# Patient Record
Sex: FEMALE | URBAN - METROPOLITAN AREA
[De-identification: names, ages, dates, MRNs, and addresses within clinical notes are randomized per-mention and may not be internally consistent; named-entity substitution may affect disease eponyms.]

---

## 2018-10-04 ENCOUNTER — EMERGENCY (EMERGENCY)
Facility: HOSPITAL | Age: 48
LOS: 1 days | Discharge: HOME | End: 2018-10-04
Attending: STUDENT IN AN ORGANIZED HEALTH CARE EDUCATION/TRAINING PROGRAM
Payer: COMMERCIAL

## 2018-10-04 VITALS
DIASTOLIC BLOOD PRESSURE: 89 MMHG | TEMPERATURE: 98 F | OXYGEN SATURATION: 99 % | HEART RATE: 83 BPM | RESPIRATION RATE: 18 BRPM | SYSTOLIC BLOOD PRESSURE: 158 MMHG

## 2018-10-04 VITALS
OXYGEN SATURATION: 96 % | HEART RATE: 82 BPM | RESPIRATION RATE: 16 BRPM | TEMPERATURE: 98 F | DIASTOLIC BLOOD PRESSURE: 85 MMHG | SYSTOLIC BLOOD PRESSURE: 149 MMHG

## 2018-10-04 PROCEDURE — 99283 EMERGENCY DEPT VISIT LOW MDM: CPT | Mod: 25

## 2018-10-04 PROCEDURE — 99283 EMERGENCY DEPT VISIT LOW MDM: CPT

## 2018-10-04 RX ORDER — OFLOXACIN OTIC SOLUTION 3 MG/ML
5 SOLUTION/ DROPS AURICULAR (OTIC)
Qty: 1 | Refills: 0 | OUTPATIENT
Start: 2018-10-04 | End: 2018-10-10

## 2018-10-04 RX ORDER — CIPROFLOXACIN 6 MG/ML
5 SUSPENSION INTRATYMPANIC
Qty: 1 | Refills: 0 | OUTPATIENT
Start: 2018-10-04 | End: 2018-10-10

## 2018-10-04 RX ORDER — CIPROFLOXACIN 6 MG/ML
12 SUSPENSION INTRATYMPANIC
Qty: 84 | Refills: 0 | OUTPATIENT
Start: 2018-10-04 | End: 2018-10-10

## 2018-10-04 NOTE — ED PROVIDER NOTE - ATTENDING CONTRIBUTION TO CARE
48 F no pmh complaining of decreased hearing, preassue and throbbing noise in left ear after being hit on that ear by a volleyball 4 days prior. left ear traumatic injury 4 days ago when volleyball hit her ear, No new dizziness, She also reports decreased sensation ot left external ear. Pt denies ear drainage, loc, neck pain, back pain, cp, sob, n, v, other numbness, weakness, visual changes.   GEN: no acute respiratory distress. nontoxic, speaking comfortably in full sentences, ambulating with steady gait.  HEENT: NCAT. face symmetrical. PERRL 4mm, EOMI, normal auditory canal b/l, left TM ruptured, no drainage. decreased sensation of  left pinna compared to right but has sensation. nose midline and without discharge,  MMM, oropharynx wnl.  Neck: no JVD, trachea midline, no LAD, FROM, no tenderness  CV: RRR. +S1S2, no murmur. 2+ pulses in 4 extremities  Chest: CTA B/l. no wheezing, rales, rhonchi. no retractions. good air movement.   ABD: +BS, soft, non distended, non tender.  MSK: No clubbing, cyanosis, edema. FROM of all extremities. no tenderness to palpation. No midline or paraspinal tenderness.   Neuro: AOOX3.  CN 2-12 intact; Sensation intact, motor 5/5 throughout, no ataxia    Traumatic left ruptured TM and decreased Left pinna sensation. throbbing likely tinnitus. no signs of infection. spoke with ENT and patient can f/u outpatient. rx abx gtt and advised to call ENT for appointment in 2-3 days. Return precautions were discussed with patient at bedside and patient expressed understanding.

## 2018-10-04 NOTE — ED ADULT NURSE NOTE - OBJECTIVE STATEMENT
48y female arrived to ED complaining of ear pain. Patient reports that she was hit on the left side of the head by a volleyball on Sunday. Patient has since experienced worsening ear pain and pressure. Patient endorses jaw pain. Reports that she hears a pulsing in the left ear and has decreased ability to hear out of the left ear. Patient denies CP, SOB, n/v/d, ab pain, chills, fever. Patient A&Ox4, ambulatory, VS stable.

## 2018-10-04 NOTE — ED PROVIDER NOTE - PLAN OF CARE
1) Please follow-up with your primary care doctor within the next 3 days.  Follow up with an ear/ nose throat specialist within 3 days. You can call our clinic at [phone] to make an appointment.  Please call today or tomorrow for an appointment.  If you cannot follow-up with your doctor(s), please return to the ED for any urgent issues.  2) If you have any worsening of symptoms or any other concerns please return to the ED immediately.  3) Please continue taking your home medications as directed.  4) You may have been given a copy of your labs and/or imaging.  Please go over these with your primary care doctor. You have a tympanic membrane rupture, you should apply antibiotic drops- 5 drops, twice a day, until your appointment with ENT. Return to the emergency room for persistent dizziness or numbness or weakness or fevers.    1) Please follow-up with your primary care doctor within the next 3 days.  Follow up with an ear/ nose throat specialist within 3 days. You can call our clinic at [phone] to make an appointment.  Please call today or tomorrow for an appointment.  If you cannot follow-up with your doctor(s), please return to the ED for any urgent issues.  2) If you have any worsening of symptoms or any other concerns please return to the ED immediately.  3) Please continue taking your home medications as directed.  4) You may have been given a copy of your labs and/or imaging.  Please go over these with your primary care doctor.

## 2018-10-04 NOTE — ED PROVIDER NOTE - PROGRESS NOTE DETAILS
Chuck PGY2: Spoken to ENT [place] to assess whether traumatic TM rupture would require an emergent consult given the sensory deficit around the ear, was told to have pt f/u outpt and to take ciprofloxacin drops 5 drops in affected ear twice a day.

## 2018-10-04 NOTE — ED PROVIDER NOTE - OBJECTIVE STATEMENT
48F no pmhx c/o left ear traumatic injury 4 days ago when volleyball hit her ear, notes left sided sensory deficit around the area, and pain with decreased hearing. 48F no pmhx c/o left ear traumatic injury 4 days ago when volleyball hit her ear, notes left sided sensory deficit around the area, and pain with decreased hearing. No new dizziness, states she has a history of intermittent dizziness, non exacerbated by this incident, however pt notes increased difficulty sleeping and persistent pressure like discomfort without pain. No double vision, no LOC during incident. No fevers/ chills or infectious symptoms.

## 2018-10-04 NOTE — ED PROVIDER NOTE - PHYSICAL EXAMINATION
Gen: AAOx3, NAD   Head: NCAT  ENT: Airway patent, moist mucous membranes, nasal passageways clear, no pharyngeal erythema or exudates, uvula midline, no cervical lymphadenopathy, + left tympanic ear rupture with mild old blood in the canal, right ear without issues   Cardiac: Normal rate, normal rhythm, no murmurs/rubs/gallops appreciated  Respiratory: Lungs CTA B/L  Gastrointestinal: +BS, Abdomen soft, nontender, nondistended, no rebound, no guarding, no organomegaly   MSK: No gross abnormalities, FROM of all four extremities, no edema  HEME: Extremities warm, pulses intact and symmetrical in all four extremities  Skin: No rashes, no lesions  Neuro: No gross neurologic deficits, CN II-XII intact, no facial asymmetry, no gait abnormality, + left ear numbness/ tingling around the external ear

## 2018-10-04 NOTE — ED POST DISCHARGE NOTE - REASON FOR FOLLOW-UP
Other Lai Wolf DO: 10/5: received email patient called ED with question. attempted to call patient back but no answer and mailbox full.

## 2018-10-04 NOTE — ED PROVIDER NOTE - NS ED ROS FT
CONST: no fevers, no chills, weight loss, weakness, appetite changes  EYES: no pain, vision loss/dipoplia/decreased vision  ENT: + left ear decreased hearing and pressure like discomfort  CV: no chest pain, no palpitations  RESP: no shortness of breath  ABD: no abdominal pain, no nausea, no vomitting  : no dysuria, increased frequency, or hematuria  MSK: no back pain  NEURO: no headache or additional neurologic complaints  HEME: no easy bleeding  SKIN:  no rash

## 2018-10-04 NOTE — ED PROVIDER NOTE - CARE PLAN
Principal Discharge DX:	Tympanic membrane rupture, left  Assessment and plan of treatment:	1) Please follow-up with your primary care doctor within the next 3 days.  Follow up with an ear/ nose throat specialist within 3 days. You can call our clinic at [phone] to make an appointment.  Please call today or tomorrow for an appointment.  If you cannot follow-up with your doctor(s), please return to the ED for any urgent issues.  2) If you have any worsening of symptoms or any other concerns please return to the ED immediately.  3) Please continue taking your home medications as directed.  4) You may have been given a copy of your labs and/or imaging.  Please go over these with your primary care doctor. Principal Discharge DX:	Tympanic membrane rupture, left  Assessment and plan of treatment:	You have a tympanic membrane rupture, you should apply antibiotic drops- 5 drops, twice a day, until your appointment with ENT. Return to the emergency room for persistent dizziness or numbness or weakness or fevers.    1) Please follow-up with your primary care doctor within the next 3 days.  Follow up with an ear/ nose throat specialist within 3 days. You can call our clinic at [phone] to make an appointment.  Please call today or tomorrow for an appointment.  If you cannot follow-up with your doctor(s), please return to the ED for any urgent issues.  2) If you have any worsening of symptoms or any other concerns please return to the ED immediately.  3) Please continue taking your home medications as directed.  4) You may have been given a copy of your labs and/or imaging.  Please go over these with your primary care doctor.

## 2019-02-25 ENCOUNTER — APPOINTMENT (OUTPATIENT)
Dept: OTOLARYNGOLOGY | Facility: CLINIC | Age: 49
End: 2019-02-25

## 2019-11-07 ENCOUNTER — EMERGENCY (EMERGENCY)
Facility: HOSPITAL | Age: 49
LOS: 1 days | Discharge: HOME | End: 2019-11-07
Attending: EMERGENCY MEDICINE
Payer: COMMERCIAL

## 2019-11-07 VITALS
HEIGHT: 65 IN | SYSTOLIC BLOOD PRESSURE: 152 MMHG | WEIGHT: 139.99 LBS | RESPIRATION RATE: 17 BRPM | HEART RATE: 73 BPM | TEMPERATURE: 98 F | OXYGEN SATURATION: 100 % | DIASTOLIC BLOOD PRESSURE: 90 MMHG

## 2019-11-07 VITALS
OXYGEN SATURATION: 100 % | TEMPERATURE: 98 F | HEART RATE: 77 BPM | SYSTOLIC BLOOD PRESSURE: 154 MMHG | DIASTOLIC BLOOD PRESSURE: 91 MMHG | RESPIRATION RATE: 17 BRPM

## 2019-11-07 PROCEDURE — 99283 EMERGENCY DEPT VISIT LOW MDM: CPT

## 2019-11-07 RX ORDER — CIPROFLOXACIN AND DEXAMETHASONE 3; 1 MG/ML; MG/ML
4 SUSPENSION/ DROPS AURICULAR (OTIC)
Qty: 7.5 | Refills: 0
Start: 2019-11-07 | End: 2019-11-13

## 2019-11-07 NOTE — ED PROVIDER NOTE - CLINICAL SUMMARY MEDICAL DECISION MAKING FREE TEXT BOX
Patient presenting with sinus pain/congestion and left ear pain. B/l ears wnl. Likely sinusitis with associated ear pain. Will recommend pain control and ENT followup

## 2019-11-07 NOTE — ED PROVIDER NOTE - NSFOLLOWUPCLINICS_GEN_ALL_ED_FT
[facility] - ENT  Otolaryngology (ENT)  [address] [place] [address]  Phone: [phone]  Fax:   Follow Up Time: Routine

## 2019-11-07 NOTE — ED PROVIDER NOTE - CARE PROVIDER_API CALL
Miguel A Key)  Otolaryngology  [address] [place] [address], [address]  [place] [address]  Phone: [phone]  Fax: [phone]  Follow Up Time: Routine

## 2019-11-07 NOTE — ED PROVIDER NOTE - PATIENT PORTAL LINK FT
You can access the FollowMyHealth Patient Portal offered by [facility] by registering at the following website: [email or web address]. By joining [facility]’s FollowMyHealth portal, you will also be able to view your health information using other applications (apps) compatible with our system.

## 2019-11-07 NOTE — ED PROVIDER NOTE - NSFOLLOWUPINSTRUCTIONS_ED_ALL_ED_FT
Thank you for visiting our Emergency Department, it has been a pleasure taking part in your healthcare. Please follow up with your primary doctor within x48 hours and return for any new or worsening symptoms.    Schedule appointment with ENT doctor  Administer ear drops as prescribed     A copy of your workup has been included with your discharge papers including any lab tests/imaging and any findings have been discussed with you. You have had a detailed discussion with your provider regarding your diagnosis, management and discharge plan. You have been given the opportunity to have your questions answered. At this time you have been deemed stable and fit for discharge.

## 2019-11-07 NOTE — ED PROVIDER NOTE - NS ED ROS FT
GENERAL: No fever or chills  EYES: no change in vision  HEENT: sinus congestion, left ear pain   CARDIAC: no chest pain or palpitations  PULMONARY: no cough or SOB  GI: no abdominal pain, nausea, vomiting, diarrhea, or constipation   : No changes in urination  SKIN: no rashes  NEURO: no headache, numbness, or weakness  MSK: No joint pain

## 2019-11-07 NOTE — ED PROVIDER NOTE - OBJECTIVE STATEMENT
49F with no pmh presenting with left ear pain >1 week with associated sinus congestion/pain. Endorses hx of traumatic TM rupture and concerned about her ear. Denies fever, chills, cp, sob, n/v/d, dizziness, headache, changes in vision or hearing.

## 2019-11-07 NOTE — ED ADULT NURSE NOTE - OBJECTIVE STATEMENT
Patient is a 49 year old female complaining of left ear pain. Arrived by walk-in. Patient has history of left eardrum ruptured a year ago. Patient is A&O x 4. Pt reports left ear for a few days and left face pain, pt reports taking [id number] Tylenol. Denies complaints of chest pain, sob, fevers, headache, chills, n/v/d, syncope, Abd is soft, non tender, non distended. Skin is warm and dry. Color is consistent with ethnicity. VSS/ NAD. Safety and comfort maintained.  Will continue to monitor.

## 2019-11-07 NOTE — ED PROVIDER NOTE - ATTENDING CONTRIBUTION TO CARE
MD Miguel:  patient seen and evaluated personally.   I agree with the History & Physical,  Impression & Plan other than what was detailed in my note.  MD Miguel    PT w/ cc of left ear pain x 3 weeks, no visual disturbance, no ha, associated w runny nose, sore throat that she had weeks ago. pt also has hx of perforated tm in past. no jaw pain, not worse with moving jaw. afebrile vitals stable, non toxic, ttp over left ear, canal is mildly erythematous, no discharge, tm normal b/l. will treat for otitis externa, fu w/ ent.

## 2019-11-07 NOTE — ED PROVIDER NOTE - PHYSICAL EXAMINATION
GEN: NAD, awake, well appearing  HEENT: NCAT, MMM, normal conjunctiva, perrl, b/l TM clear, intact   CHEST/LUNGS: Non-tachypneic, CTAB, bilateral breath sounds  CARDIAC: Non-tachycardic, s1s2, normal perfusion, no peripheral edema  SKIN: No rashes, no petechiae, no vesicles  NEURO: CN grossly intact, normal coordination, no focal motor or sensory deficits

## 2023-02-01 DIAGNOSIS — Z00.00 ENCOUNTER FOR GENERAL ADULT MEDICAL EXAMINATION W/OUT ABNORMAL FINDINGS: ICD-10-CM

## 2023-02-08 ENCOUNTER — APPOINTMENT (OUTPATIENT)
Dept: INTERNAL MEDICINE | Facility: CLINIC | Age: 53
End: 2023-02-08

## 2023-03-01 ENCOUNTER — APPOINTMENT (OUTPATIENT)
Dept: INTERNAL MEDICINE | Facility: CLINIC | Age: 53
End: 2023-03-01
Payer: COMMERCIAL

## 2023-03-01 VITALS
RESPIRATION RATE: 16 BRPM | HEIGHT: 64 IN | HEART RATE: 72 BPM | OXYGEN SATURATION: 98 % | BODY MASS INDEX: 25.44 KG/M2 | TEMPERATURE: 96.7 F | SYSTOLIC BLOOD PRESSURE: 142 MMHG | WEIGHT: 149 LBS | DIASTOLIC BLOOD PRESSURE: 80 MMHG

## 2023-03-01 VITALS — DIASTOLIC BLOOD PRESSURE: 78 MMHG | SYSTOLIC BLOOD PRESSURE: 138 MMHG

## 2023-03-01 DIAGNOSIS — R51.9 HEADACHE, UNSPECIFIED: ICD-10-CM

## 2023-03-01 DIAGNOSIS — Z83.3 FAMILY HISTORY OF DIABETES MELLITUS: ICD-10-CM

## 2023-03-01 PROCEDURE — 99204 OFFICE O/P NEW MOD 45 MIN: CPT | Mod: 25

## 2023-03-01 RX ORDER — FEXOFENADINE HCL 60 MG
60 CAPSULE ORAL
Refills: 0 | Status: ACTIVE | COMMUNITY

## 2023-03-01 RX ORDER — FEXOFENADINE HCL 60 MG
TABLET ORAL
Refills: 0 | Status: ACTIVE | COMMUNITY
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
## 2023-03-01 NOTE — ASSESSMENT
[FreeTextEntry1] : Pt will return for fasting labs at another time\par Gyn, mammogram, breast surgery, bone density, colonoscopy/FOBT advised\par Support offered\par fuv in 1 month or sooner if necessary\par

## 2023-03-01 NOTE — PHYSICAL EXAM
[No Acute Distress] : no acute distress [Well Nourished] : well nourished [Well Developed] : well developed [Well-Appearing] : well-appearing [Normal Sclera/Conjunctiva] : normal sclera/conjunctiva [PERRL] : pupils equal round and reactive to light [EOMI] : extraocular movements intact [Normal Oropharynx] : the oropharynx was normal [No Lymphadenopathy] : no lymphadenopathy [Supple] : supple [Thyroid Normal, No Nodules] : the thyroid was normal and there were no nodules present [No Respiratory Distress] : no respiratory distress  [No Accessory Muscle Use] : no accessory muscle use [Clear to Auscultation] : lungs were clear to auscultation bilaterally [Normal Rate] : normal rate  [Regular Rhythm] : with a regular rhythm [Normal S1, S2] : normal S1 and S2 [No Murmur] : no murmur heard [No Edema] : there was no peripheral edema [Soft] : abdomen soft [Non Tender] : non-tender [Non-distended] : non-distended [Normal Bowel Sounds] : normal bowel sounds [Normal Supraclavicular Nodes] : no supraclavicular lymphadenopathy [Normal Posterior Cervical Nodes] : no posterior cervical lymphadenopathy [Normal Anterior Cervical Nodes] : no anterior cervical lymphadenopathy [No CVA Tenderness] : no CVA  tenderness [Coordination Grossly Intact] : coordination grossly intact [No Focal Deficits] : no focal deficits [Normal Gait] : normal gait [Deep Tendon Reflexes (DTR)] : deep tendon reflexes were 2+ and symmetric [Speech Grossly Normal] : speech grossly normal [Memory Grossly Normal] : memory grossly normal [Normal Affect] : the affect was normal [Alert and Oriented x3] : oriented to person, place, and time [Normal Mood] : the mood was normal [Normal Insight/Judgement] : insight and judgment were intact [de-identified] : mild, mild bruising of right eyelid [de-identified] : mild bruising right forearm

## 2023-04-11 ENCOUNTER — EMERGENCY (EMERGENCY)
Facility: HOSPITAL | Age: 53
LOS: 1 days | Discharge: HOME | End: 2023-04-11
Attending: EMERGENCY MEDICINE
Payer: COMMERCIAL

## 2023-04-11 VITALS
TEMPERATURE: 99 F | RESPIRATION RATE: 18 BRPM | DIASTOLIC BLOOD PRESSURE: 100 MMHG | HEIGHT: 64.5 IN | HEART RATE: 84 BPM | OXYGEN SATURATION: 98 % | WEIGHT: 143.96 LBS | SYSTOLIC BLOOD PRESSURE: 168 MMHG

## 2023-04-11 VITALS
SYSTOLIC BLOOD PRESSURE: 122 MMHG | RESPIRATION RATE: 16 BRPM | DIASTOLIC BLOOD PRESSURE: 84 MMHG | OXYGEN SATURATION: 97 % | HEART RATE: 77 BPM | TEMPERATURE: 99 F

## 2023-04-11 LAB
ALBUMIN SERPL ELPH-MCNC: 5 G/DL — SIGNIFICANT CHANGE UP (ref 3.3–5)
ALP SERPL-CCNC: 67 U/L — SIGNIFICANT CHANGE UP (ref 40–120)
ALT FLD-CCNC: 22 U/L — SIGNIFICANT CHANGE UP (ref 10–45)
ANION GAP SERPL CALC-SCNC: 13 MMOL/L — SIGNIFICANT CHANGE UP (ref 5–17)
AST SERPL-CCNC: 20 U/L — SIGNIFICANT CHANGE UP (ref 10–40)
BASOPHILS # BLD AUTO: 0.07 K/UL — SIGNIFICANT CHANGE UP (ref 0–0.2)
BASOPHILS NFR BLD AUTO: 0.9 % — SIGNIFICANT CHANGE UP (ref 0–2)
BILIRUB SERPL-MCNC: 0.5 MG/DL — SIGNIFICANT CHANGE UP (ref 0.2–1.2)
BUN SERPL-MCNC: 12 MG/DL — SIGNIFICANT CHANGE UP (ref 7–23)
CALCIUM SERPL-MCNC: 10.4 MG/DL — SIGNIFICANT CHANGE UP (ref 8.4–10.5)
CHLORIDE SERPL-SCNC: 102 MMOL/L — SIGNIFICANT CHANGE UP (ref 96–108)
CO2 SERPL-SCNC: 23 MMOL/L — SIGNIFICANT CHANGE UP (ref 22–31)
CREAT SERPL-MCNC: 0.75 MG/DL — SIGNIFICANT CHANGE UP (ref 0.5–1.3)
EGFR: 95 ML/MIN/1.73M2 — SIGNIFICANT CHANGE UP
EOSINOPHIL # BLD AUTO: 0.15 K/UL — SIGNIFICANT CHANGE UP (ref 0–0.5)
EOSINOPHIL NFR BLD AUTO: 1.8 % — SIGNIFICANT CHANGE UP (ref 0–6)
GLUCOSE SERPL-MCNC: 92 MG/DL — SIGNIFICANT CHANGE UP (ref 70–99)
HCT VFR BLD CALC: 42.8 % — SIGNIFICANT CHANGE UP (ref 34.5–45)
HGB BLD-MCNC: 14.1 G/DL — SIGNIFICANT CHANGE UP (ref 11.5–15.5)
HIV 1 & 2 AB SERPL IA.RAPID: SIGNIFICANT CHANGE UP
LYMPHOCYTES # BLD AUTO: 1.1 K/UL — SIGNIFICANT CHANGE UP (ref 1–3.3)
LYMPHOCYTES # BLD AUTO: 13.4 % — SIGNIFICANT CHANGE UP (ref 13–44)
MANUAL SMEAR VERIFICATION: SIGNIFICANT CHANGE UP
MCHC RBC-ENTMCNC: 31.2 PG — SIGNIFICANT CHANGE UP (ref 27–34)
MCHC RBC-ENTMCNC: 32.9 GM/DL — SIGNIFICANT CHANGE UP (ref 32–36)
MCV RBC AUTO: 94.7 FL — SIGNIFICANT CHANGE UP (ref 80–100)
MONOCYTES # BLD AUTO: 0.37 K/UL — SIGNIFICANT CHANGE UP (ref 0–0.9)
MONOCYTES NFR BLD AUTO: 4.5 % — SIGNIFICANT CHANGE UP (ref 2–14)
NEUTROPHILS # BLD AUTO: 5.86 K/UL — SIGNIFICANT CHANGE UP (ref 1.8–7.4)
NEUTROPHILS NFR BLD AUTO: 71.4 % — SIGNIFICANT CHANGE UP (ref 43–77)
PLAT MORPH BLD: NORMAL — SIGNIFICANT CHANGE UP
PLATELET # BLD AUTO: 296 K/UL — SIGNIFICANT CHANGE UP (ref 150–400)
POTASSIUM SERPL-MCNC: 3.9 MMOL/L — SIGNIFICANT CHANGE UP (ref 3.5–5.3)
POTASSIUM SERPL-SCNC: 3.9 MMOL/L — SIGNIFICANT CHANGE UP (ref 3.5–5.3)
PROT SERPL-MCNC: 8.2 G/DL — SIGNIFICANT CHANGE UP (ref 6–8.3)
RBC # BLD: 4.52 M/UL — SIGNIFICANT CHANGE UP (ref 3.8–5.2)
RBC # FLD: 11.9 % — SIGNIFICANT CHANGE UP (ref 10.3–14.5)
RBC BLD AUTO: SIGNIFICANT CHANGE UP
SODIUM SERPL-SCNC: 138 MMOL/L — SIGNIFICANT CHANGE UP (ref 135–145)
VARIANT LYMPHS # BLD: 8 % — HIGH (ref 0–6)
WBC # BLD: 8.21 K/UL — SIGNIFICANT CHANGE UP (ref 3.8–10.5)
WBC # FLD AUTO: 8.21 K/UL — SIGNIFICANT CHANGE UP (ref 3.8–10.5)

## 2023-04-11 PROCEDURE — 85025 COMPLETE CBC W/AUTO DIFF WBC: CPT

## 2023-04-11 PROCEDURE — 73080 X-RAY EXAM OF ELBOW: CPT | Mod: 26,RT

## 2023-04-11 PROCEDURE — 99284 EMERGENCY DEPT VISIT MOD MDM: CPT

## 2023-04-11 PROCEDURE — 80074 ACUTE HEPATITIS PANEL: CPT

## 2023-04-11 PROCEDURE — 80053 COMPREHEN METABOLIC PANEL: CPT

## 2023-04-11 PROCEDURE — 86703 HIV-1/HIV-2 1 RESULT ANTBDY: CPT

## 2023-04-11 PROCEDURE — 36415 COLL VENOUS BLD VENIPUNCTURE: CPT

## 2023-04-11 PROCEDURE — 73130 X-RAY EXAM OF HAND: CPT

## 2023-04-11 PROCEDURE — 73080 X-RAY EXAM OF ELBOW: CPT

## 2023-04-11 PROCEDURE — 99284 EMERGENCY DEPT VISIT MOD MDM: CPT | Mod: 25

## 2023-04-11 PROCEDURE — 90471 IMMUNIZATION ADMIN: CPT

## 2023-04-11 PROCEDURE — 90715 TDAP VACCINE 7 YRS/> IM: CPT

## 2023-04-11 PROCEDURE — 73130 X-RAY EXAM OF HAND: CPT | Mod: 26,RT

## 2023-04-11 RX ORDER — TETANUS TOXOID, REDUCED DIPHTHERIA TOXOID AND ACELLULAR PERTUSSIS VACCINE, ADSORBED 5; 2.5; 8; 8; 2.5 [IU]/.5ML; [IU]/.5ML; UG/.5ML; UG/.5ML; UG/.5ML
0.5 SUSPENSION INTRAMUSCULAR ONCE
Refills: 0 | Status: COMPLETED | OUTPATIENT
Start: 2023-04-11 | End: 2023-04-11

## 2023-04-11 RX ADMIN — Medication 1 TABLET(S): at 22:19

## 2023-04-11 RX ADMIN — TETANUS TOXOID, REDUCED DIPHTHERIA TOXOID AND ACELLULAR PERTUSSIS VACCINE, ADSORBED 0.5 MILLILITER(S): 5; 2.5; 8; 8; 2.5 SUSPENSION INTRAMUSCULAR at 20:13
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
## 2023-04-11 NOTE — ED PROVIDER NOTE - NSFOLLOWUPINSTRUCTIONS_ED_ALL_ED_FT
Follow up with your Primary Care Physician within the next 2-3 days  Bring a copy of your test results with you to your appointment  Continue your current medication regimen  Return to the Emergency Room if you experience new or worsening symptoms abdominal pain, nausea, vomiting, fever chills, cough, chest pain, shortness of breath, dizziness, slurred speech, weakness, gait abnormality  AUGMENTIN TWICE DAILY FOR THE NEXT 10 DAYS  FOLLOW UP WITH INFECTIOUS DISEASE WITHIN 1 WEEK  [address], [place] [address]  Phone: [phone]
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
## 2023-04-11 NOTE — ED PROVIDER NOTE - PATIENT PORTAL LINK FT
You can access the FollowMyHealth Patient Portal offered by [facility] by registering at the following website: [email or web address]. By joining [facility]’s FollowMyHealth portal, you will also be able to view your health information using other applications (apps) compatible with our system.

## 2023-04-11 NOTE — ED PROVIDER NOTE - NSFOLLOWUPCLINICS_GEN_ALL_ED_FT
[facility] - Infectious Disease  Infectious Disease  [address], [place] [address]  Phone: [phone]  Fax:
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
## 2023-04-12 DIAGNOSIS — Z23 ENCOUNTER FOR IMMUNIZATION: ICD-10-CM

## 2023-04-12 LAB
HAV IGM SER-ACNC: SIGNIFICANT CHANGE UP
HBV CORE IGM SER-ACNC: SIGNIFICANT CHANGE UP
HBV SURFACE AG SER-ACNC: SIGNIFICANT CHANGE UP
HCV AB S/CO SERPL IA: 0.09 S/CO — SIGNIFICANT CHANGE UP (ref 0–0.99)
HCV AB SERPL-IMP: SIGNIFICANT CHANGE UP

## 2023-04-20 NOTE — ED POST DISCHARGE NOTE - ADDITIONAL DOCUMENTATION
4/20/23: Patient called stating that she lost her antibiotics (Augmentin) a few days ago and is asking for prescription to be resent- states that she took 2 fulls days of dose. I resent 8 days of Augmentin to patient's desired pharmacy. I also advised patient to follow up with PMD, states that she has a PMD. All other questions answered. -Tierra Feldman PA-C

## 2023-04-20 NOTE — ED POST DISCHARGE NOTE - OTHER COMMUNICATION
5/5/23: Patient called asking for more antibiotics. States that she is almost done taking antibiotics that I had re-prescribed on 4/20 after she states that she lost her prescription. States that since she has missed days she would like more antibiotics at this time. Reports having an appointment with PCP in a few weeks and overall improvement in wound. I told patient that I can not send more antibiotics and that if she has any concerns about wound/ antibiotics that she can always be reevaluated in the emergency department. -Tierra Feldman PA-C 5/5/23: Patient called asking for more antibiotics. States that she is almost done taking antibiotics that I had re-prescribed on 4/20 after she states that she lost her prescription. States that since she has missed days she would like more antibiotics at this time. Reports having an appointment with PCP in a few weeks and overall improvement in wound. I told patient that I can not send more antibiotics and that if she has any concerns about wound/ antibiotics that she can always be reevaluated in the emergency department. -Tierra Feldman PA-C    //    5/5/23:  Pt lvm on after hours service with only call back number, I called pt who reiterated call to KARLA Feldman, asking for continued abx as she did not complete the course that was taken sporadically since her visit on 4/11, I informed pt I will not Rx abx over the phone, pt report she does not believe the wound on the hand in infected but would like abx as a prophylaxis for inf as the wounds have not completely healed. I informed pt that they would need to be reassessed to determine if abx are necessary, cannot prescribe additional abx and discussed abx stewardship and risk of resistance with inappropriate abx. I advised pt to f/u outpt with PCP, pt report unable to see them for weeks, I suggested evaluation at an urgent care if in need of urgent wound check, return to ED if any concern for new/continued/worsening infection. -Jd Jerome PA-C

## 2023-08-19 ENCOUNTER — EMERGENCY (EMERGENCY)
Facility: HOSPITAL | Age: 53
LOS: 1 days | Discharge: HOME | End: 2023-08-19
Attending: EMERGENCY MEDICINE
Payer: COMMERCIAL

## 2023-08-19 VITALS
HEART RATE: 100 BPM | HEIGHT: 64 IN | OXYGEN SATURATION: 97 % | SYSTOLIC BLOOD PRESSURE: 200 MMHG | RESPIRATION RATE: 20 BRPM | DIASTOLIC BLOOD PRESSURE: 90 MMHG | WEIGHT: 145.06 LBS

## 2023-08-19 LAB
ALBUMIN SERPL ELPH-MCNC: 4.3 G/DL — SIGNIFICANT CHANGE UP (ref 3.3–5)
ALP SERPL-CCNC: 70 U/L — SIGNIFICANT CHANGE UP (ref 40–120)
ALT FLD-CCNC: 13 U/L — SIGNIFICANT CHANGE UP (ref 10–45)
ANION GAP SERPL CALC-SCNC: 11 MMOL/L — SIGNIFICANT CHANGE UP (ref 5–17)
APTT BLD: 37.4 SEC — HIGH (ref 24.5–35.6)
AST SERPL-CCNC: 15 U/L — SIGNIFICANT CHANGE UP (ref 10–40)
BASOPHILS # BLD AUTO: 0.02 K/UL — SIGNIFICANT CHANGE UP (ref 0–0.2)
BASOPHILS NFR BLD AUTO: 0.3 % — SIGNIFICANT CHANGE UP (ref 0–2)
BILIRUB SERPL-MCNC: 0.2 MG/DL — SIGNIFICANT CHANGE UP (ref 0.2–1.2)
BUN SERPL-MCNC: 19 MG/DL — SIGNIFICANT CHANGE UP (ref 7–23)
CALCIUM SERPL-MCNC: 9.9 MG/DL — SIGNIFICANT CHANGE UP (ref 8.4–10.5)
CHLORIDE SERPL-SCNC: 108 MMOL/L — SIGNIFICANT CHANGE UP (ref 96–108)
CO2 SERPL-SCNC: 23 MMOL/L — SIGNIFICANT CHANGE UP (ref 22–31)
CREAT SERPL-MCNC: 0.86 MG/DL — SIGNIFICANT CHANGE UP (ref 0.5–1.3)
EGFR: 80 ML/MIN/1.73M2 — SIGNIFICANT CHANGE UP
EOSINOPHIL # BLD AUTO: 0.1 K/UL — SIGNIFICANT CHANGE UP (ref 0–0.5)
EOSINOPHIL NFR BLD AUTO: 1.7 % — SIGNIFICANT CHANGE UP (ref 0–6)
GLUCOSE SERPL-MCNC: 104 MG/DL — HIGH (ref 70–99)
HCT VFR BLD CALC: 40.3 % — SIGNIFICANT CHANGE UP (ref 34.5–45)
HGB BLD-MCNC: 13.2 G/DL — SIGNIFICANT CHANGE UP (ref 11.5–15.5)
IMM GRANULOCYTES NFR BLD AUTO: 0.2 % — SIGNIFICANT CHANGE UP (ref 0–0.9)
INR BLD: 1 RATIO — SIGNIFICANT CHANGE UP (ref 0.85–1.18)
LYMPHOCYTES # BLD AUTO: 1.96 K/UL — SIGNIFICANT CHANGE UP (ref 1–3.3)
LYMPHOCYTES # BLD AUTO: 32.9 % — SIGNIFICANT CHANGE UP (ref 13–44)
MCHC RBC-ENTMCNC: 30.3 PG — SIGNIFICANT CHANGE UP (ref 27–34)
MCHC RBC-ENTMCNC: 32.8 GM/DL — SIGNIFICANT CHANGE UP (ref 32–36)
MCV RBC AUTO: 92.6 FL — SIGNIFICANT CHANGE UP (ref 80–100)
MONOCYTES # BLD AUTO: 0.34 K/UL — SIGNIFICANT CHANGE UP (ref 0–0.9)
MONOCYTES NFR BLD AUTO: 5.7 % — SIGNIFICANT CHANGE UP (ref 2–14)
NEUTROPHILS # BLD AUTO: 3.53 K/UL — SIGNIFICANT CHANGE UP (ref 1.8–7.4)
NEUTROPHILS NFR BLD AUTO: 59.2 % — SIGNIFICANT CHANGE UP (ref 43–77)
NRBC # BLD: 0 /100 WBCS — SIGNIFICANT CHANGE UP (ref 0–0)
PLATELET # BLD AUTO: 323 K/UL — SIGNIFICANT CHANGE UP (ref 150–400)
POTASSIUM SERPL-MCNC: 4.3 MMOL/L — SIGNIFICANT CHANGE UP (ref 3.5–5.3)
POTASSIUM SERPL-SCNC: 4.3 MMOL/L — SIGNIFICANT CHANGE UP (ref 3.5–5.3)
PROT SERPL-MCNC: 7.7 G/DL — SIGNIFICANT CHANGE UP (ref 6–8.3)
PROTHROM AB SERPL-ACNC: 11 SEC — SIGNIFICANT CHANGE UP (ref 9.5–13)
RBC # BLD: 4.35 M/UL — SIGNIFICANT CHANGE UP (ref 3.8–5.2)
RBC # FLD: 12 % — SIGNIFICANT CHANGE UP (ref 10.3–14.5)
SODIUM SERPL-SCNC: 142 MMOL/L — SIGNIFICANT CHANGE UP (ref 135–145)
WBC # BLD: 5.96 K/UL — SIGNIFICANT CHANGE UP (ref 3.8–10.5)
WBC # FLD AUTO: 5.96 K/UL — SIGNIFICANT CHANGE UP (ref 3.8–10.5)

## 2023-08-19 PROCEDURE — 76642 ULTRASOUND BREAST LIMITED: CPT | Mod: 26,LT

## 2023-08-19 PROCEDURE — 99285 EMERGENCY DEPT VISIT HI MDM: CPT

## 2023-08-19 NOTE — ED PROVIDER NOTE - ATTENDING CONTRIBUTION TO CARE
I performed a history and physical exam of the patient and discussed their management with the resident. I reviewed the resident's note and agree with the documented findings and plan of care.  Della Dave MD

## 2023-08-19 NOTE — ED PROVIDER NOTE - PATIENT PORTAL LINK FT
You can access the FollowMyHealth Patient Portal offered by [facility] by registering at the following website: [email or web address]. By joining [facility]’s FollowMyHealth portal, you will also be able to view your health information using other applications (apps) compatible with our system.

## 2023-08-19 NOTE — ED PROVIDER NOTE - CLINICAL SUMMARY MEDICAL DECISION MAKING FREE TEXT BOX
52 y/o F history of saline breast implant presenting for Left breast wound.     VSS, afebrile.   Patient incidentally noted to be hypertensive to 200 systolic which she attributes to feeling anxious. No palpitations, cp, vision changes or concern for hypertensive crisis. No known history of HTN.    DDx: Left breast wound with or without cellulitis abscess less likely. 54 y/o F history of saline breast implant presenting for Left breast wound.     VSS, afebrile.   Patient incidentally noted to be hypertensive to 200 systolic which she attributes to feeling anxious. No palpitations, cp, vision changes or concern for hypertensive crisis. No known history of HTN.    DDx: Left breast wound with cellulitis, abscess less likely.

## 2023-08-19 NOTE — ED PROVIDER NOTE - PROGRESS NOTE DETAILS
Result of ultrasound of breast reviewed, notable for possible implant rupture.  Patient well-appearing with reassuring vital signs and lab work.  Patient did have temperature 100.1.  Offered patient admission for observation and for plastic surgery consultation, patient states that she needs to be discharged to attend to personal matters.  I think this is reasonable given patient's well appearance.  We will prescribe oral antibiotics, I advised patient to return to the emergency department within 1 to 2 days for repeat evaluation and to consider admission at that time.  Will attempt to expedite follow-up with plastic surgery as outpatient as well

## 2023-08-19 NOTE — ED ADULT NURSE NOTE - OBJECTIVE STATEMENT
53y F PMH bilateral cosmetic saline breast implants presents to the ED c/o L breast wound. Pt reports her L breast implant flipped and now pt feels a 'valve' under her breast. Reports her bra has irritated the area and caused a small amount of yellow/brown fluid onto a paper towel. Pt R breast implant ruptured several years ago. Denies nvd, cough, headache, fevers, chills, abd pain. VS documented. Comfort and safety maintained.

## 2023-08-19 NOTE — ED PROVIDER NOTE - OBJECTIVE STATEMENT
53-year-old female past medical history of bilateral saline breast implants ("for cosmetic reasons" 13 years ago, no CA history) presents for a wound under her left breast. Patient states that in that breast her implant flipped a while ago and now she feels the valve near her inframammary fold which has become irritated from her bra and has drained a small amount of yellow fluid on to a paper towel. She is concerned that it has become infected.   She states that her right breast implant ruptured several years ago, currently she is not concerned about that breast.     Patient denies fevers, chills, n/v/d

## 2023-08-19 NOTE — ED PROVIDER NOTE - PHYSICAL EXAMINATION
VITALS: reviewed  GEN: NAD, A & O x 4  HEAD/EYES: NCAT, PERRL, EOMI, anicteric sclerae, no conjunctival pallor  ENT: mucus membranes moist, oropharynx WNL, trachea midline, no JVD  RESP: lungs CTA with equal breath sounds bilaterally, chest wall nontender and atraumatic  CV: heart with reg rhythm S1, S2, no murmur; distal pulses intact and symmetric bilaterally  ABDOMEN: normoactive bowel sounds, soft, nondistended, nontender, no palpable masses  MSK: extremities atraumatic and nontender, no edema, no asymmetry.   SKIN: warm, dry, no rash, no bruising, no cyanosis. color appropriate for ethnicity  BREAST EXAM: (done in presence of chaperone nurse Eva Fernandez) left breast- mildly tender on exam. 2cm area of ecchymosis with abrasion at 6 oclock position of breast near inframmaary fold. No visible fluid leakage or bleeding.   R breast- not examined  NEURO: alert, mentating appropriately, no facial asymmetry. gross sensation, motor, coordination are intact  PSYCH: Affect appropriate

## 2023-08-19 NOTE — ED ADULT NURSE NOTE - NSFALLUNIVINTERV_ED_ALL_ED
Bed/Stretcher in lowest position, wheels locked, appropriate side rails in place/Call bell, personal items and telephone in reach/Instruct patient to call for assistance before getting out of bed/chair/stretcher/Non-slip footwear applied when patient is off stretcher/[place] to call system/Physically safe environment - no spills, clutter or unnecessary equipment/Purposeful proactive rounding/Room/bathroom lighting operational, light cord in reach

## 2023-08-19 NOTE — ED PROVIDER NOTE - NSFOLLOWUPINSTRUCTIONS_ED_ALL_ED_FT
Take the antibiotics Augmentin and doxycycline as prescribed, complete the entire course.    Please return to the emergency department within 1 to 2 days for repeat evaluation to ensure you are improving.    Return immediately to the emergency department at any time if you develop high fevers with a temperature over 101 Fahrenheit.    We have placed you in our referral system to help expedite a follow-up with plastic surgery as an outpatient, we will contact you to help you arrange this follow-up.

## 2023-08-20 VITALS
DIASTOLIC BLOOD PRESSURE: 93 MMHG | TEMPERATURE: 99 F | RESPIRATION RATE: 15 BRPM | OXYGEN SATURATION: 100 % | HEART RATE: 93 BPM | SYSTOLIC BLOOD PRESSURE: 152 MMHG

## 2023-08-20 PROBLEM — Z86.79 PERSONAL HISTORY OF OTHER DISEASES OF THE CIRCULATORY SYSTEM: Chronic | Status: ACTIVE | Noted: 2023-04-11

## 2023-08-20 LAB
BASE EXCESS BLDV CALC-SCNC: 2 MMOL/L — SIGNIFICANT CHANGE UP (ref -2–3)
CA-I SERPL-SCNC: 1.22 MMOL/L — SIGNIFICANT CHANGE UP (ref 1.15–1.33)
CHLORIDE BLDV-SCNC: 106 MMOL/L — SIGNIFICANT CHANGE UP (ref 96–108)
CO2 BLDV-SCNC: 29 MMOL/L — HIGH (ref 22–26)
GAS PNL BLDV: 139 MMOL/L — SIGNIFICANT CHANGE UP (ref 136–145)
GAS PNL BLDV: SIGNIFICANT CHANGE UP
GAS PNL BLDV: SIGNIFICANT CHANGE UP
GLUCOSE BLDV-MCNC: 94 MG/DL — SIGNIFICANT CHANGE UP (ref 70–99)
HCO3 BLDV-SCNC: 27 MMOL/L — SIGNIFICANT CHANGE UP (ref 22–29)
HCT VFR BLDA CALC: 39 % — SIGNIFICANT CHANGE UP (ref 34.5–46.5)
HGB BLD CALC-MCNC: 12.9 G/DL — SIGNIFICANT CHANGE UP (ref 11.7–16.1)
LACTATE BLDV-MCNC: 0.6 MMOL/L — SIGNIFICANT CHANGE UP (ref 0.5–2)
PCO2 BLDV: 44 MMHG — HIGH (ref 39–42)
PH BLDV: 7.4 — SIGNIFICANT CHANGE UP (ref 7.32–7.43)
PO2 BLDV: 54 MMHG — HIGH (ref 25–45)
POTASSIUM BLDV-SCNC: 3.8 MMOL/L — SIGNIFICANT CHANGE UP (ref 3.5–5.1)
SAO2 % BLDV: 86.5 % — SIGNIFICANT CHANGE UP (ref 67–88)

## 2023-08-20 PROCEDURE — 82947 ASSAY GLUCOSE BLOOD QUANT: CPT

## 2023-08-20 PROCEDURE — 84295 ASSAY OF SERUM SODIUM: CPT

## 2023-08-20 PROCEDURE — 83605 ASSAY OF LACTIC ACID: CPT

## 2023-08-20 PROCEDURE — 82330 ASSAY OF CALCIUM: CPT

## 2023-08-20 PROCEDURE — 85610 PROTHROMBIN TIME: CPT

## 2023-08-20 PROCEDURE — 84132 ASSAY OF SERUM POTASSIUM: CPT

## 2023-08-20 PROCEDURE — 85730 THROMBOPLASTIN TIME PARTIAL: CPT

## 2023-08-20 PROCEDURE — 85018 HEMOGLOBIN: CPT

## 2023-08-20 PROCEDURE — 85025 COMPLETE CBC W/AUTO DIFF WBC: CPT

## 2023-08-20 PROCEDURE — 80053 COMPREHEN METABOLIC PANEL: CPT

## 2023-08-20 PROCEDURE — 85014 HEMATOCRIT: CPT

## 2023-08-20 PROCEDURE — 99284 EMERGENCY DEPT VISIT MOD MDM: CPT | Mod: 25

## 2023-08-20 PROCEDURE — 36415 COLL VENOUS BLD VENIPUNCTURE: CPT

## 2023-08-20 PROCEDURE — 87040 BLOOD CULTURE FOR BACTERIA: CPT

## 2023-08-20 PROCEDURE — 82803 BLOOD GASES ANY COMBINATION: CPT

## 2023-08-20 PROCEDURE — 82435 ASSAY OF BLOOD CHLORIDE: CPT

## 2023-08-20 PROCEDURE — 76642 ULTRASOUND BREAST LIMITED: CPT

## 2023-08-20 RX ORDER — VANCOMYCIN HCL 1 G
1000 VIAL (EA) INTRAVENOUS ONCE
Refills: 0 | Status: DISCONTINUED | OUTPATIENT
Start: 2023-08-20 | End: 2023-08-20

## 2023-08-20 RX ORDER — PIPERACILLIN AND TAZOBACTAM 4; .5 G/20ML; G/20ML
3.38 INJECTION, POWDER, LYOPHILIZED, FOR SOLUTION INTRAVENOUS ONCE
Refills: 0 | Status: DISCONTINUED | OUTPATIENT
Start: 2023-08-20 | End: 2023-08-20

## 2023-08-20 RX ORDER — CIPROFLOXACIN AND DEXAMETHASONE 3; 1 MG/ML; MG/ML
4 SUSPENSION/ DROPS AURICULAR (OTIC)
Qty: 7.5 | Refills: 0
Start: 2023-08-20 | End: 2023-08-26

## 2023-08-20 RX ADMIN — Medication 100 MILLIGRAM(S): at 01:40

## 2023-08-20 RX ADMIN — Medication 1 TABLET(S): at 01:40

## 2023-08-20 NOTE — ED POST DISCHARGE NOTE - ADDITIONAL DOCUMENTATION
8/23/23: Patient called twice. Patient states that she lost both antibiotics prescribed (Augmentin and Doxy), taking only two full days of antibiotics, requesting medicaions be resent to pharmacy. Will send prescription for 5 days (7 days total) to desired pharmacy. I advised patient to take medication as prescribed for 7 days only, as prescription was originally written. Patient initally stated that she needed follow up with plastics but then mentioned that she can follow up with her plastic surgeon from many years although is having difficulty obtaining her medical records. Patient already has spoken with Medical records and patient care services. I advised patient to call back departments during regular business hours. Also recommended that patient make a patient portal.  All questions answered. -Tierra Feldman PA-C

## 2023-08-20 NOTE — ED POST DISCHARGE NOTE - OTHER COMMUNICATION
Pt left vm, abx not sent to correct pharmacy, pt needs amox-clav and doxy, but only amox-clav sent to preferred pharmacy ([facility]). I sent abx to pt preferred pharmacy. Pt with multiple questions about abx and f/u care, all answered, advised pt on DC instructions. and need for f/u urgently. -Jd Jerome PA-C

## 2023-08-25 LAB
CULTURE RESULTS: SIGNIFICANT CHANGE UP
CULTURE RESULTS: SIGNIFICANT CHANGE UP
SPECIMEN SOURCE: SIGNIFICANT CHANGE UP
SPECIMEN SOURCE: SIGNIFICANT CHANGE UP

## 2023-09-07 ENCOUNTER — APPOINTMENT (OUTPATIENT)
Dept: VASCULAR SURGERY | Facility: CLINIC | Age: 53
End: 2023-09-07

## 2023-09-12 ENCOUNTER — APPOINTMENT (OUTPATIENT)
Dept: INTERNAL MEDICINE | Facility: CLINIC | Age: 53
End: 2023-09-12
Payer: COMMERCIAL

## 2023-09-12 PROCEDURE — 99443: CPT

## 2023-09-15 ENCOUNTER — APPOINTMENT (OUTPATIENT)
Dept: VASCULAR SURGERY | Facility: CLINIC | Age: 53
End: 2023-09-15
Payer: COMMERCIAL

## 2023-09-15 VITALS
DIASTOLIC BLOOD PRESSURE: 82 MMHG | SYSTOLIC BLOOD PRESSURE: 137 MMHG | HEART RATE: 66 BPM | BODY MASS INDEX: 23.61 KG/M2 | HEIGHT: 64.5 IN | WEIGHT: 140 LBS

## 2023-09-15 DIAGNOSIS — M79.662 PAIN IN LEFT LOWER LEG: ICD-10-CM

## 2023-09-15 PROCEDURE — 93970 EXTREMITY STUDY: CPT

## 2023-09-15 PROCEDURE — 99204 OFFICE O/P NEW MOD 45 MIN: CPT

## 2023-09-15 RX ORDER — DOXYCYCLINE HYCLATE 50 MG/1
CAPSULE ORAL
Refills: 0 | Status: ACTIVE | COMMUNITY

## 2023-09-15 RX ORDER — AMOXICILLIN AND CLAVULANATE POTASSIUM 500; 125 MG/1; 1/1
TABLET, FILM COATED ORAL
Refills: 0 | Status: ACTIVE | COMMUNITY